# Patient Record
Sex: MALE | Race: WHITE
[De-identification: names, ages, dates, MRNs, and addresses within clinical notes are randomized per-mention and may not be internally consistent; named-entity substitution may affect disease eponyms.]

---

## 2021-10-05 ENCOUNTER — HOSPITAL ENCOUNTER (EMERGENCY)
Dept: HOSPITAL 41 - JD.ED | Age: 71
Discharge: HOME | End: 2021-10-05
Payer: MEDICARE

## 2021-10-05 DIAGNOSIS — Z79.899: ICD-10-CM

## 2021-10-05 DIAGNOSIS — Z79.84: ICD-10-CM

## 2021-10-05 DIAGNOSIS — E11.9: ICD-10-CM

## 2021-10-05 DIAGNOSIS — I25.2: ICD-10-CM

## 2021-10-05 DIAGNOSIS — N40.0: ICD-10-CM

## 2021-10-05 DIAGNOSIS — N20.2: Primary | ICD-10-CM

## 2021-10-05 DIAGNOSIS — I10: ICD-10-CM

## 2021-10-05 PROCEDURE — 96375 TX/PRO/DX INJ NEW DRUG ADDON: CPT

## 2021-10-05 PROCEDURE — 74176 CT ABD & PELVIS W/O CONTRAST: CPT

## 2021-10-05 PROCEDURE — 87086 URINE CULTURE/COLONY COUNT: CPT

## 2021-10-05 PROCEDURE — 99284 EMERGENCY DEPT VISIT MOD MDM: CPT

## 2021-10-05 PROCEDURE — 96365 THER/PROPH/DIAG IV INF INIT: CPT

## 2021-10-05 NOTE — CT
CT abdomen and pelvis

 

Technique: Multiple axial sections were obtained from above the dome 

of the diaphragm inferiorly through the pubic symphysis.  Intravenous 

and oral contrast were not utilized.  Study has been performed as a 

ureteral stone protocol.  Reconstructed coronal and sagittal images 

were obtained.

 

Comparison: No prior abdominal imaging is available.

 

Findings: There is mild hydronephrosis of the right kidney being seen.

  This finding is caused by an obstructing ureteral stone measuring 5 

mm which is located within the mid right ureter slightly superior to 

the iliac crest level.  There is a bladder calculus being seen 

measuring 1.7 cm.  No additional renal calculi are seen.

 

Small nodule is noted within the right lung base measuring 4 mm.  

Other portions of the visualized lung bases show nothing acute.

 

Liver shows diffuse fatty infiltration.  Spleen size is normal.  

Adrenal glands show no nodule.  Pancreas shows no abnormality.  

Gallbladder contains no calcified gallstones.

 

Abdominal aorta shows atherosclerotic calcification which continues 

into the iliac vessels.  No aneurysm is seen.  No retroperitoneal 

adenopathy or mesenteric abnormalities are seen.  Appendix is seen 

which is normal in size.  Diverticuli are seen within the descending 

and sigmoid regions without inflammatory change of diverticulitis.  

Prostate gland is enlarged.  Fat containing left inguinal hernia is 

noted.  Small fat-containing umbilical hernia is seen.

 

Bone window settings were reviewed which show mild scattered 

degenerative change throughout the spine.  Degenerative change is also

 noted within both sacroiliac joints.

 

Impression:

1.  Obstructing 5 mm calculus within the mid right ureter located 

slightly above the level of the iliac crests.

2.  Bladder calculi is noted measuring 1.7 cm.

3.  Small nodule is noted within the right lung base measuring 5 mm.  

Recommend follow-up noncontrast chest CT study in one year.

4.  Other nonacute findings as described above.

 

Diagnostic code #3

## 2021-10-05 NOTE — EDM.PDOC
ED HPI GENERAL MEDICAL PROBLEM





- General


Chief Complaint: Flank Pain


Stated Complaint: INFUSION


Time Seen by Provider: 10/05/21 16:01


Source of Information: Reports: Patient, RN Notes Reviewed


History Limitations: Reports: No Limitations





- History of Present Illness


INITIAL COMMENTS - FREE TEXT/NARRATIVE: 





Patient is a 71-year-old male presenting to the emergency department from the 

[facility] with concerns of a possible kidney stone.  Patient reports 

right-sided flank pain which radiates to his right lower quadrant abdomen since 

Sunday.  Pain has been able to be relieved by Tylenol, however when the Tylenol 

wears off the pain returns.  Patient reports that when he is in pain, he feels 

sweaty, but has not had a documented fever.  He denies any history of kidney s

tones.  He has had no obvious blood in his urine.  He reports that on Sunday he 

did have vomiting, however has not vomited since that time.  Patient received 

975 mg of Tylenol in the waiting room.





Blood work and urinalysis was completed in clinic.  Results showed WBC elevated 

16.38, chloride 97, anion gap 20.5, BUN 30, creatinine 1.8.  Urinalysis showed 

trace occult blood and 5-10 RBCs but no evidence of infection.


  ** Right Flank


Pain Score (Numeric/FACES): 8





- Related Data


                                    Allergies











Allergy/AdvReac Type Severity Reaction Status Date / Time


 


No Known Allergies Allergy   Verified 10/05/21 15:41











Home Meds: 


                                    Home Meds





Acetaminophen/oxyCODONE [Percocet 325-5 MG] 1 each PO Q4H PRN #15 tab 10/05/21 

[Rx]


Alfuzosin HCl [Alfuzosin HCl ER] 1 tab PO DAILY 10/05/21 [History]


Dulaglutide [Trulicity] 1 injection INJECT WEEKLY 10/05/21 [History]


Finasteride 5 mg PO DAILY 10/05/21 [History]


Glimepiride 4 mg PO BID 10/05/21 [History]


Ondansetron [Zofran ODT] 4 mg PO Q6H PRN #10 tab.dis 10/05/21 [Rx]


Ramipril [Altace] 5 mg PO DAILY 10/05/21 [History]


Simvastatin 20 mg PO DAILY 10/05/21 [History]


Tamsulosin [Tamsulosin 24 Hr] 0.4 mg PO DAILY #14 cap.er 10/05/21 [Rx]


carvediloL [Coreg] 12.5 mg PO BID 10/05/21 [History]











Past Medical History


HEENT History: Reports: Impaired Vision


Cardiovascular History: Reports: Hypertension, MI


Genitourinary History: Reports: BPH


Endocrine/Metabolic History: Reports: Diabetes, Type II


Oncologic (Cancer) History: Reports: Prostate





- Past Surgical History


Cardiovascular Surgical History: Reports: Coronary Artery Stent


Neurological Surgical History: Reports: Lumbar Spine





Social & Family History





- Tobacco Use


Tobacco Use Status *Q: Never Tobacco User





- Caffeine Use


Caffeine Use: Reports: Coffee





- Recreational Drug Use


Recreational Drug Use: No





ED ROS GENERAL





- Review of Systems


Review Of Systems: See Below


Constitutional: Reports: No Symptoms.  Denies: Fever


HEENT: Reports: No Symptoms


Respiratory: Reports: No Symptoms


Cardiovascular: Reports: No Symptoms


Endocrine: Reports: No Symptoms


GI/Abdominal: Reports: No Symptoms.  Denies: Diarrhea, Vomiting


: Reports: Flank Pain.  Denies: Hematuria


Musculoskeletal: Reports: No Symptoms


Skin: Reports: No Symptoms


Neurological: Reports: No Symptoms


Psychiatric: Reports: No Symptoms


Hematologic/Lymphatic: Reports: No Symptoms


Immunologic: Reports: No Symptoms





ED EXAM, RENAL/





- Physical Exam


Exam: See Below


Exam Limited By: No Limitations


General Appearance: Alert, WD/WN, No Apparent Distress


Respiratory/Chest: No Respiratory Distress, Lungs Clear, Normal Breath Sounds, 

No Accessory Muscle Use, Chest Non-Tender


Cardiovascular: Normal Peripheral Pulses, Regular Rate, Rhythm, No Edema, No 

Gallop, No JVD, No Murmur, No Rub


GI/Abdominal: Normal Bowel Sounds, Soft, Non-Tender, No Organomegaly, No 

Distention, No Abnormal Bruit, No Mass


Back Exam: Normal Inspection, Full Range of Motion, CVA Tenderness (R).  No: CVA

 Tenderness (L)


Neurological: Alert, Oriented, CN II-XII Intact, Normal Cognition, Normal Gait, 

Normal Reflexes, No Motor/Sensory Deficits


Psychiatric: Normal Affect, Normal Mood


Skin Exam: Warm, Dry, Intact, Normal Color, No Rash





Course





- Vital Signs


Last Recorded V/S: 


                                Last Vital Signs











Temp  98.2 F   10/05/21 15:39


 


Pulse  65   10/05/21 16:18


 


Resp  16   10/05/21 16:18


 


BP  177/73 H  10/05/21 16:18


 


Pulse Ox  92 L  10/05/21 16:18














- Orders/Labs/Meds


Orders: 


                               Active Orders 24 hr











 Category Date Time Status


 


 CULTURE URINE [MREF] Stat Lab  10/05/21 12:40 Received











Meds: 


Medications














Discontinued Medications














Generic Name Dose Route Start Last Admin





  Trade Name Dayna  PRN Reason Stop Dose Admin


 


Acetaminophen  975 mg  10/05/21 15:47  10/05/21 15:52





  Acetaminophen 325 Mg Tab  PO  10/05/21 15:48  975 mg





  NOW ONE   Administration


 


Hydrocodone Bitart/Acetaminophen  1 tab  10/05/21 16:58 





  Acetaminophen/Hydrocodone 325-5 Mg Tab  PO  10/05/21 16:59 





  ONETIME ONE  


 


Hydromorphone HCl  0.5 mg  10/05/21 17:02  10/05/21 17:17





  Hydromorphone 0.5 Mg/0.5 Ml Syringe  IVPUSH  10/05/21 17:03  0.5 mg





  ONETIME ONE   Administration


 


Sodium Chloride  1,000 mls @ 999 mls/hr  10/05/21 16:07  10/05/21 16:15





  Normal Saline  IV  10/05/21 17:07  999 mls/hr





  NOW STA   Administration


 


Ceftriaxone Sodium 2 gm/  100 mls @ 200 mls/hr  10/05/21 17:30  10/05/21 17:51





  Sodium Chloride  IV  10/05/21 17:59  200 mls/hr





  ONETIME ONE   Administration


 


Ketorolac Tromethamine  30 mg  10/05/21 17:02  10/05/21 17:18





  Ketorolac 30 Mg/Ml Sdv  IVPUSH  10/05/21 17:03  30 mg





  ONETIME ONE   Administration


 


Ondansetron HCl  4 mg  10/05/21 17:02  10/05/21 17:18





  Ondansetron 4 Mg/2 Ml Sdv  IVPUSH  10/05/21 17:03  4 mg





  ONETIME ONE   Administration


 


Tamsulosin HCl  0.4 mg  10/05/21 16:59  10/05/21 17:18





  Tamsulosin 0.4 Mg Cap.Er  PO  10/05/21 17:00  0.4 mg





  ONETIME ONE   Administration














- Re-Assessments/Exams


Free Text/Narrative Re-Assessment/Exam: 


Patient is a 71-year-old male presenting to the emergency department from the 

[facility] for further evaluation of right-sided flank pain.  Blood work

 and urinalysis were completed in clinic.  WBCs were elevated at 16.38, however 

there is no evidence of infection in his urine.  He is also quite dehydrated 

given an anion gap of 20.5 BUN of 30, creatinine 1.8.  Elevation in WBCs may be 

a combination of hemoconcentration and stress response.  Patient is afebrile on 

arrival to ER.  On exam, he does have significant right CVA tenderness.  I have 

ordered a CT abdomen pelvis without contrast.  He denies the need for additional

 pain medications at this time.











10/05/21 18:08


CT scan abdomen pelvis impression as follows:


1.  Obstructing 5 mm calculus in the mid right ureter located slightly above the

 level of the iliac crest.


2.  Bladder calculi is noted measuring 1.7 cm


3.  Small nodule noted in the right lung base measuring 5 mm.  Recommend follow-

up noncontrast CT in 1 year.


4.  Other nonacute findings as described above.





Results discussed with patient.  His pain has increased.  Have ordered Toradol, 

Zofran, Dilaudid.  His primary care was concerned that he could have some 

infection associate with stone, however there are no obvious signs of infection 

at this time.  I will send his urine for culture.  I will order Rocephin 2 g IV 

to cover for any infection.  Recommend that he contact his primary care provider

 tomorrow to follow-up.  He has been provided a urine strainer.  I will write 

prescriptions for Flomax, Zofran, and Percocet.  I will also send referral to 

urologist, Dr. Moss, in case he does not pass the stone on his own.





Departure





- Departure


Time of Disposition: 18:09


Disposition: Home, Self-Care 01


Condition: Good


Clinical Impression: 


 Kidney stone








- Discharge Information


*PRESCRIPTION DRUG MONITORING PROGRAM REVIEWED*: No


*COPY OF PRESCRIPTION DRUG MONITORING REPORT IN PATIENT SHANTI: No


Prescriptions: 


Tamsulosin [Tamsulosin 24 Hr] 0.4 mg PO DAILY #14 cap.er


Acetaminophen/oxyCODONE [Percocet 325-5 MG] 1 each PO Q4H PRN #15 tab


 PRN Reason: Pain


Ondansetron [Zofran ODT] 4 mg PO Q6H PRN #10 tab.dis


 PRN Reason: Nausea/Vomiting


Instructions:  Kidney Stones, Easy-to-Read


Referrals: 


Tu Najera MD [Physician] - 


Lucho Moss MD [Ordering Only Provider] - 


Forms:  ED Department Discharge


Additional Instructions: 


You were seen in the emergency department for right-sided flank pain since 

Sunday.





Blood work and urinalysis was completed in the clinic. Your blood work indicated

that you are dehydrated. Your white blood cells were also elevated, however the 

exact cause of this is unclear.  This could be related to dehydration and 

stress.  While in the ER, you received IV fluids, nausea medication, antibiotics

and pain medication.  





Your urine has been sent for culture.  In the meantime, you did receive IV 

antibiotics in the emergency department. 





You have been discharged home with prescriptions for Flomax, Zofran, and 

Percocet.  Take these medications as prescribed.





Do not work or drive for 12 hours after taking the Percocet as it can be 

sedating.  





Recommend contacting your primary care provider, Dr. Najera, tomorrow for 

follow-up.  





Strain your urine each time that you urinate.  If you have not passed the stone 

in 1 week, recommend follow-up with urology.  Referral has been sent to Dr. Moss at [facility] in [place].  





Return to ER for any new or worsening symptoms of concern.





Sepsis Event Note (ED)





- Evaluation


Sepsis Screening Result: No Definite Risk





- My Orders


Last 24 Hours: 


My Active Orders





10/05/21 12:40


CULTURE URINE [MREF] Stat 














- Assessment/Plan


Last 24 Hours: 


My Active Orders





10/05/21 12:40


CULTURE URINE [MREF] Stat

## 2021-10-08 ENCOUNTER — HOSPITAL ENCOUNTER (EMERGENCY)
Dept: HOSPITAL 41 - JD.ED | Age: 71
Discharge: HOME | End: 2021-10-08
Payer: MEDICARE

## 2021-10-08 DIAGNOSIS — N23: Primary | ICD-10-CM

## 2021-10-08 DIAGNOSIS — Z95.5: ICD-10-CM

## 2021-10-08 DIAGNOSIS — I10: ICD-10-CM

## 2021-10-08 DIAGNOSIS — I25.2: ICD-10-CM

## 2021-10-08 DIAGNOSIS — E11.9: ICD-10-CM

## 2021-10-08 DIAGNOSIS — Z79.899: ICD-10-CM

## 2021-10-08 PROCEDURE — 80053 COMPREHEN METABOLIC PANEL: CPT

## 2021-10-08 PROCEDURE — 81001 URINALYSIS AUTO W/SCOPE: CPT

## 2021-10-08 PROCEDURE — 96374 THER/PROPH/DIAG INJ IV PUSH: CPT

## 2021-10-08 PROCEDURE — 99284 EMERGENCY DEPT VISIT MOD MDM: CPT

## 2021-10-08 PROCEDURE — 85025 COMPLETE CBC W/AUTO DIFF WBC: CPT

## 2021-10-08 PROCEDURE — 36415 COLL VENOUS BLD VENIPUNCTURE: CPT

## 2021-10-08 PROCEDURE — 83735 ASSAY OF MAGNESIUM: CPT

## 2021-10-08 PROCEDURE — 86140 C-REACTIVE PROTEIN: CPT

## 2021-10-08 NOTE — EDM.PDOC
ED HPI GENERAL MEDICAL PROBLEM





- General


Chief Complaint: Flank Pain


Stated Complaint: FLANK PAIN


Time Seen by Provider: 10/08/21 13:21


Source of Information: Reports: Patient


History Limitations: Reports: No Limitations





- History of Present Illness


INITIAL COMMENTS - FREE TEXT/NARRATIVE: 





71-year-old male presents the emergency department today with complaints of 

right flank pain.  Patient was diagnosed with kidney stone in this emergency 

department 3 days ago.  He states he has been straining his urine and has had 

one very small stone noted however he does not feel like he is passed the other 

stone.  He states he feels as though the stone is moving as it was in the middle

of his back and now his pain is primarily in his groin area on the right side.  

He states that yesterday he was only needing to take Tylenol however today he 

has required the Norco that was prescribed to him and is wondering whether or 

not he may need another CT scan.  Patient states he did have the chills last 

evening however has been afebrile otherwise.  He has been nauseated however has 

been using his Zofran as prescribed.  He has not had any vomiting or diarrhea.  

He has not had any urinary frequency urgency or incontinence.  Did have a very 

large bowel movement this morning.  Patient is concerned that he is also going 

to run out of his hydrocodone's.  Per the ER records from 10/5/2021 patient does

have a 5 cm right kidney stone.


  ** Right Flank


Pain Score (Numeric/FACES): 6





- Related Data


                                    Allergies











Allergy/AdvReac Type Severity Reaction Status Date / Time


 


No Known Allergies Allergy   Verified 10/08/21 12:55











Home Meds: 


                                    Home Meds





Acetaminophen/oxyCODONE [Percocet 325-5 MG] 1 each PO Q4H PRN #15 tab 10/05/21 

[Rx]


Alfuzosin HCl [Alfuzosin HCl ER] 1 tab PO DAILY 10/05/21 [History]


Dulaglutide [Trulicity] 1 injection INJECT WEEKLY 10/05/21 [History]


Finasteride 5 mg PO DAILY 10/05/21 [History]


Glimepiride 4 mg PO BID 10/05/21 [History]


Ondansetron [Zofran ODT] 4 mg PO Q6H PRN #10 tab.dis 10/05/21 [Rx]


Ramipril [Altace] 5 mg PO DAILY 10/05/21 [History]


Simvastatin 20 mg PO DAILY 10/05/21 [History]


Tamsulosin [Tamsulosin 24 Hr] 0.4 mg PO DAILY #14 cap.er 10/05/21 [Rx]


carvediloL [Coreg] 12.5 mg PO BID 10/05/21 [History]


Hydrocodone/Acetaminophen [HYDROcodone-Acetaminophen 5-325 MG] 1 each PO Q4H PRN

#14 tab 10/08/21 [Rx]


levoFLOXacin [Levaquin] 750 mg PO DAILY #5 tab 10/08/21 [Rx]











Past Medical History


HEENT History: Reports: Impaired Vision


Cardiovascular History: Reports: Hypertension, MI


Genitourinary History: Reports: BPH


Endocrine/Metabolic History: Reports: Diabetes, Type II


Oncologic (Cancer) History: Reports: Prostate





- Past Surgical History


Cardiovascular Surgical History: Reports: Coronary Artery Stent


Neurological Surgical History: Reports: Lumbar Spine





Social & Family History





- Caffeine Use


Caffeine Use: Reports: Coffee





ED ROS GENERAL





- Review of Systems


Review Of Systems: Comprehensive ROS is negative, except as noted in HPI.





ED EXAM, RENAL/





- Physical Exam


Exam: See Below


Exam Limited By: No Limitations


General Appearance: Alert, WD/WN, Mild Distress


Ears: Normal External Exam, Hearing Grossly Normal


Nose: Normal Inspection


Throat/Mouth: Normal Inspection, Normal Lips, Normal Voice, No Airway Compromise


Head: Atraumatic


Neck: Normal Inspection, Supple


Respiratory/Chest: No Respiratory Distress, Lungs Clear, Normal Breath Sounds, 

No Accessory Muscle Use, Chest Non-Tender


Cardiovascular: Normal Peripheral Pulses, Regular Rate, Rhythm, No Edema, No 

Murmur


GI/Abdominal: Normal Bowel Sounds, Soft, Non-Tender, No Distention


 (Male) Exam: Deferred


Rectal (Males) Exam: Deferred


Back Exam: Normal Inspection, Full Range of Motion, CVA Tenderness (R).  No: CVA

 Tenderness (L)


Extremities: Normal Inspection


Neurological: Alert, Oriented, Normal Cognition


Psychiatric: Normal Affect, Normal Mood


Skin Exam: Warm, Dry, Intact, Normal Color, No Rash


Lymphatic: No Adenopathy





Course





- Vital Signs


Text/Narrative:: 





As stated above, patient presents with right flank pain and was diagnosed with a

 kidney stone on the right ureter 3 days ago.  He is questioning whether or not 

he may need another CT scan to see if the stone has moved.  Upon exam, the 

patient does appear to be in mild to moderate distress due to the pain.  There 

is no costovertebral angle tenderness.  He is hemodynamically stable at this 

time.  He states he has been drinking plenty of fluids.  Patient will receive 

another liter of normal saline IV as well as 1/2 mg of Dilaudid IV.  Will obtain

 lab studies to include a CBC, CMP, magnesium and a C-reactive protein.  Also 

obtain a urinalysis.  Concern is for pyelonephritis.  Is agreeable to this plan


Last Recorded V/S: 


                                Last Vital Signs











Temp  97.0 F   10/08/21 12:52


 


Pulse  95   10/08/21 12:52


 


Resp  20   10/08/21 12:52


 


BP  146/88 H  10/08/21 12:52


 


Pulse Ox  92 L  10/08/21 12:52














- Orders/Labs/Meds


Labs: 


                                Laboratory Tests











  10/08/21 10/08/21 10/08/21 Range/Units





  14:00 14:05 14:05 


 


WBC   14.51 H   (4.23-9.07)  K/mm3


 


RBC   5.09   (4.63-6.08)  M/mm3


 


Hgb   15.1   (13.7-17.5)  gm/dl


 


Hct   46.2   (40.1-51.0)  %


 


MCV   90.8   (79.0-92.2)  fl


 


MCH   29.7   (25.7-32.2)  pg


 


MCHC   32.7   (32.2-35.5)  g/dl


 


RDW Std Deviation   44.0 H   (35.1-43.9)  fL


 


Plt Count   257   (163-337)  K/mm3


 


MPV   10.1   (9.4-12.3)  fl


 


Neut % (Auto)   85.9 H   (34.0-67.9)  %


 


Lymph % (Auto)   8.1 L   (21.8-53.1)  %


 


Mono % (Auto)   5.7   (5.3-12.2)  %


 


Eos % (Auto)   0.1 L   (0.8-7.0)  


 


Baso % (Auto)   0.1   (0.1-1.2)  %


 


Neut # (Auto)   12.47 H   (1.78-5.38)  K/mm3


 


Lymph # (Auto)   1.17 L   (1.32-3.57)  K/mm3


 


Mono # (Auto)   0.82   (0.30-0.82)  K/mm3


 


Eos # (Auto)   0.01 L   (0.04-0.54)  K/mm3


 


Baso # (Auto)   0.02   (0.01-0.08)  K/mm3


 


Sodium    135 L  (136-145)  mEq/L


 


Potassium    4.4  (3.5-5.1)  mEq/L


 


Chloride    99  ()  mEq/L


 


Carbon Dioxide    25  (21-32)  mEq/L


 


Anion Gap    15.4 H  (5-15)  


 


BUN    19 H  (7-18)  mg/dL


 


Creatinine    1.2  (0.7-1.3)  mg/dL


 


Est Cr Clr Drug Dosing    61.01  mL/min


 


Estimated GFR (MDRD)    60  (>60)  mL/min


 


BUN/Creatinine Ratio    15.8  (14-18)  


 


Glucose    231 H  (70-99)  mg/dL


 


Calcium    9.0  (8.5-10.1)  mg/dL


 


Magnesium    1.8  (1.8-2.4)  mg/dL


 


Total Bilirubin    0.4  (0.2-1.0)  mg/dL


 


AST    14 L  (15-37)  U/L


 


ALT    18  (16-63)  U/L


 


Alkaline Phosphatase    61  ()  U/L


 


C-Reactive Protein    7.4 H*  (<1.0)  mg/dL


 


Total Protein    7.2  (6.4-8.2)  g/dl


 


Albumin    3.1 L  (3.4-5.0)  g/dl


 


Globulin    4.1  gm/dL


 


Albumin/Globulin Ratio    0.8 L  (1-2)  


 


Urine Color  Yellow    (Yellow)  


 


Urine Appearance  Clear    (Clear)  


 


Urine pH  5.5    (5.0-8.0)  


 


Ur Specific Gravity  > or = 1.030    (1.005-1.030)  


 


Urine Protein  1+ H    (Negative)  


 


Urine Glucose (UA)  1+ H    (Negative)  


 


Urine Ketones  2+ H    (Negative)  


 


Urine Occult Blood  2+ H    (Negative)  


 


Urine Nitrite  Negative    (Negative)  


 


Urine Bilirubin  Negative    (Negative)  


 


Urine Urobilinogen  0.2    (0.2-1.0)  


 


Ur Leukocyte Esterase  Negative    (Negative)  


 


Urine RBC  5-10 H    (0-5)  /hpf


 


Urine WBC  0-5    (0-5)  /hpf


 


Ur Epithelial Cells  0-5    (0-5)  /hpf


 


Urine Bacteria  Few    (FEW)  /hpf


 


Urine Mucus  Few    (FEW)  /hpf











Meds: 


Medications














Discontinued Medications














Generic Name Dose Route Start Last Admin





  Trade Name Dayna  PRN Reason Stop Dose Admin


 


Hydromorphone HCl  0.5 mg  10/08/21 13:49  10/08/21 14:06





  Hydromorphone 0.5 Mg/0.5 Ml Syringe  IVPUSH  10/08/21 13:50  0.5 mg





  ONETIME ONE   Administration


 


Sodium Chloride  1,000 mls @ 999 mls/hr  10/08/21 13:46  10/08/21 14:09





  Normal Saline  IV  10/08/21 14:46  999 mls/hr





  ONETIME ONE   Administration














- Re-Assessments/Exams


Free Text/Narrative Re-Assessment/Exam: 





10/08/21 15:44


Allergy reveals a WBC of 14.51, hemoglobin 15.1, hematocrit 46.2, platelet count

 257





Chemistry reveals a sodium of 135 potassium 4.4, anion gap 15.4, BUN 19, 

creatinine 1.2, glucose 231, magnesium 1.8, C-reactive protein 7.4





Urinalysis reveals 1+ protein, 1+ glucose, 2+ ketones, 2+ occult blood, nitrite 

negative, leukocyte esterase negative





Patient states he is feeling much better.  His pain has completely resolved.  He

 was given an IV dose of Rocephin on his last ED visit for a white count of 16 

and was to follow-up with his primary care provider yesterday however he states 

he did not do that.  We will send a prescription for Levaquin to his pharmacy to

 treat for pyelonephritis.  We will also give the patient a few more 

hydrocodone's.  Patient has been instructed that if he still having discomfort 

on Monday that he will call [facility] or [facility] clinic for follow-

up.











Departure





- Departure


Time of Disposition: 15:46


Disposition: Home, Self-Care 01


Condition: Good


Clinical Impression: 


 Ureteric colic








- Discharge Information


Prescriptions: 


Hydrocodone/Acetaminophen [HYDROcodone-Acetaminophen 5-325 MG] 1 each PO Q4H PRN

#14 tab


 PRN Reason: Pain (Moderate 4-6)


levoFLOXacin [Levaquin] 750 mg PO DAILY #5 tab


Referrals: 


PCP,None [Primary Care Provider] - 


Forms:  ED Department Discharge


Additional Instructions: 


You were seen in the emergency department today with complaints of right flank 

pain after being diagnosed with a kidney stone.  You were given IV fluids and 

pain medication to treat the discomfort which did seem to help.  Lab studies 

were completed which did show an elevated white blood cell count.  It appears 

from your recent records that you were given IV antibiotics 2 days ago to treat 

this however there was no follow-up regarding this.  You will be started on an 

antibiotic called Levaquin.  You may take 1 tab daily for the next 5 days.  As 

discussed continue to strain all urine and drink plenty of fluids.  Should your 

discomfort not resolve by Monday of this next week, recommend that you call to 

schedule appointment with urology either at [facility] or [facility] in [place].





Sepsis Event Note (ED)





- Evaluation


Sepsis Screening Result: No Definite Risk





- Focused Exam


Vital Signs: 


                                   Vital Signs











  Temp Pulse Resp BP Pulse Ox


 


 10/08/21 12:52  97.0 F  95  20  146/88 H  92 L